# Patient Record
Sex: MALE | Race: WHITE | NOT HISPANIC OR LATINO | Employment: OTHER | URBAN - METROPOLITAN AREA
[De-identification: names, ages, dates, MRNs, and addresses within clinical notes are randomized per-mention and may not be internally consistent; named-entity substitution may affect disease eponyms.]

---

## 2023-05-01 ENCOUNTER — HOSPITAL ENCOUNTER (OUTPATIENT)
Facility: CLINIC | Age: 43
Setting detail: OBSERVATION
Discharge: HOME | End: 2023-05-02
Attending: EMERGENCY MEDICINE | Admitting: HOSPITALIST
Payer: COMMERCIAL

## 2023-05-01 VITALS
OXYGEN SATURATION: 100 % | DIASTOLIC BLOOD PRESSURE: 71 MMHG | HEART RATE: 68 BPM | WEIGHT: 140 LBS | RESPIRATION RATE: 20 BRPM | SYSTOLIC BLOOD PRESSURE: 110 MMHG | TEMPERATURE: 97.7 F

## 2023-05-01 DIAGNOSIS — L03.115 CELLULITIS OF LEG, RIGHT: ICD-10-CM

## 2023-05-01 LAB
ANION GAP SERPL CALCULATED.3IONS-SCNC: 9 MMOL/L (ref 7–15)
BASOPHILS # BLD AUTO: 0 10E3/UL (ref 0–0.2)
BASOPHILS NFR BLD AUTO: 0 %
BUN SERPL-MCNC: 29.9 MG/DL (ref 6–20)
CALCIUM SERPL-MCNC: 8.6 MG/DL (ref 8.6–10)
CHLORIDE SERPL-SCNC: 103 MMOL/L (ref 98–107)
CREAT SERPL-MCNC: 0.99 MG/DL (ref 0.67–1.17)
DEPRECATED HCO3 PLAS-SCNC: 25 MMOL/L (ref 22–29)
EOSINOPHIL # BLD AUTO: 0.1 10E3/UL (ref 0–0.7)
EOSINOPHIL NFR BLD AUTO: 1 %
ERYTHROCYTE [DISTWIDTH] IN BLOOD BY AUTOMATED COUNT: 13 % (ref 10–15)
GFR SERPL CREATININE-BSD FRML MDRD: >90 ML/MIN/1.73M2
GLUCOSE SERPL-MCNC: 109 MG/DL (ref 70–99)
HCT VFR BLD AUTO: 35.5 % (ref 40–53)
HGB BLD-MCNC: 11.9 G/DL (ref 13.3–17.7)
IMM GRANULOCYTES # BLD: 0.1 10E3/UL
IMM GRANULOCYTES NFR BLD: 0 %
LYMPHOCYTES # BLD AUTO: 2.4 10E3/UL (ref 0.8–5.3)
LYMPHOCYTES NFR BLD AUTO: 21 %
MCH RBC QN AUTO: 32 PG (ref 26.5–33)
MCHC RBC AUTO-ENTMCNC: 33.5 G/DL (ref 31.5–36.5)
MCV RBC AUTO: 95 FL (ref 78–100)
MONOCYTES # BLD AUTO: 0.5 10E3/UL (ref 0–1.3)
MONOCYTES NFR BLD AUTO: 5 %
NEUTROPHILS # BLD AUTO: 8.1 10E3/UL (ref 1.6–8.3)
NEUTROPHILS NFR BLD AUTO: 73 %
NRBC # BLD AUTO: 0 10E3/UL
NRBC BLD AUTO-RTO: 0 /100
PLATELET # BLD AUTO: 254 10E3/UL (ref 150–450)
POTASSIUM SERPL-SCNC: 4.9 MMOL/L (ref 3.4–5.3)
RBC # BLD AUTO: 3.72 10E6/UL (ref 4.4–5.9)
SODIUM SERPL-SCNC: 137 MMOL/L (ref 136–145)
WBC # BLD AUTO: 11.2 10E3/UL (ref 4–11)

## 2023-05-01 PROCEDURE — 86140 C-REACTIVE PROTEIN: CPT | Performed by: EMERGENCY MEDICINE

## 2023-05-01 PROCEDURE — 80048 BASIC METABOLIC PNL TOTAL CA: CPT | Performed by: EMERGENCY MEDICINE

## 2023-05-01 PROCEDURE — 85652 RBC SED RATE AUTOMATED: CPT | Performed by: EMERGENCY MEDICINE

## 2023-05-01 PROCEDURE — 36415 COLL VENOUS BLD VENIPUNCTURE: CPT | Performed by: EMERGENCY MEDICINE

## 2023-05-01 PROCEDURE — 85025 COMPLETE CBC W/AUTO DIFF WBC: CPT | Performed by: EMERGENCY MEDICINE

## 2023-05-01 PROCEDURE — 99285 EMERGENCY DEPT VISIT HI MDM: CPT

## 2023-05-02 ENCOUNTER — APPOINTMENT (OUTPATIENT)
Dept: ULTRASOUND IMAGING | Facility: CLINIC | Age: 43
End: 2023-05-02
Attending: EMERGENCY MEDICINE
Payer: COMMERCIAL

## 2023-05-02 ENCOUNTER — APPOINTMENT (OUTPATIENT)
Dept: GENERAL RADIOLOGY | Facility: CLINIC | Age: 43
End: 2023-05-02
Attending: EMERGENCY MEDICINE
Payer: COMMERCIAL

## 2023-05-02 PROBLEM — L03.115 CELLULITIS OF LEG, RIGHT: Status: ACTIVE | Noted: 2023-05-02

## 2023-05-02 LAB
CRP SERPL-MCNC: 15.15 MG/L
ERYTHROCYTE [SEDIMENTATION RATE] IN BLOOD BY WESTERGREN METHOD: 7 MM/HR (ref 0–15)
HOLD SPECIMEN: NORMAL
LACTATE SERPL-SCNC: 0.5 MMOL/L (ref 0.7–2)

## 2023-05-02 PROCEDURE — 87040 BLOOD CULTURE FOR BACTERIA: CPT | Mod: XS | Performed by: EMERGENCY MEDICINE

## 2023-05-02 PROCEDURE — 250N000011 HC RX IP 250 OP 636: Performed by: EMERGENCY MEDICINE

## 2023-05-02 PROCEDURE — 96365 THER/PROPH/DIAG IV INF INIT: CPT

## 2023-05-02 PROCEDURE — 83605 ASSAY OF LACTIC ACID: CPT | Performed by: EMERGENCY MEDICINE

## 2023-05-02 PROCEDURE — 93971 EXTREMITY STUDY: CPT | Mod: RT

## 2023-05-02 PROCEDURE — 73562 X-RAY EXAM OF KNEE 3: CPT | Mod: RT

## 2023-05-02 PROCEDURE — G0378 HOSPITAL OBSERVATION PER HR: HCPCS

## 2023-05-02 PROCEDURE — 36415 COLL VENOUS BLD VENIPUNCTURE: CPT | Performed by: EMERGENCY MEDICINE

## 2023-05-02 RX ORDER — CEPHALEXIN 500 MG/1
500 CAPSULE ORAL 4 TIMES DAILY
Qty: 40 CAPSULE | Refills: 0 | Status: SHIPPED | OUTPATIENT
Start: 2023-05-02 | End: 2023-05-12

## 2023-05-02 RX ORDER — CEFAZOLIN SODIUM 2 G/100ML
2 INJECTION, SOLUTION INTRAVENOUS ONCE
Status: COMPLETED | OUTPATIENT
Start: 2023-05-02 | End: 2023-05-02

## 2023-05-02 RX ADMIN — CEFAZOLIN SODIUM 2 G: 2 INJECTION, SOLUTION INTRAVENOUS at 01:14

## 2023-05-02 ASSESSMENT — ACTIVITIES OF DAILY LIVING (ADL): ADLS_ACUITY_SCORE: 35

## 2023-05-02 NOTE — ED TRIAGE NOTES
Pt concerned for cellulitis in R lower leg. Pt has had this happen multiple times before. Pt had what he believed was an ingrown hair, has since progressed. Redness and swelling to R knee, moving down towards R ankle. Warm to touch.

## 2023-05-02 NOTE — ED NOTES
[facility], [place]  ED Nurse Handoff Report    Xavire Gutiérrez is a 42 year old male   ED Chief complaint: Leg Pain and Leg Swelling  . ED Diagnosis:   Final diagnoses:   Cellulitis of leg, right     Allergies: No Known Allergies    Code Status: Full Code  Activity level - Baseline/Home:  Independent. Activity Level - Current:   Independent. Lift room needed: No. Bariatric: No   Needed: No   Isolation: No. Infection: Not Applicable.     Vital Signs:   Vitals:    05/01/23 2236   BP: 110/71   Pulse: 68   Resp: 20   Temp: 97.7  F (36.5  C)   SpO2: 100%   Weight: 63.5 kg (140 lb)       Cardiac Rhythm:  ,      Pain level:    Patient confused: No. Patient Falls Risk: No.   Elimination Status: Has voided   Patient Report - Initial Complaint: here for cellulitis of right lower leg has had this before. Focused Assessment: red area is circled   Tests Performed: blood cultures. Abnormal Results:   Labs Ordered and Resulted from Time of ED Arrival to Time of ED Departure   BASIC METABOLIC PANEL - Abnormal       Result Value    Sodium 137      Potassium 4.9      Chloride 103      Carbon Dioxide (CO2) 25      Anion Gap 9      Urea Nitrogen 29.9 (*)     Creatinine 0.99      Calcium 8.6      Glucose 109 (*)     GFR Estimate >90     CBC WITH PLATELETS AND DIFFERENTIAL - Abnormal    WBC Count 11.2 (*)     RBC Count 3.72 (*)     Hemoglobin 11.9 (*)     Hematocrit 35.5 (*)     MCV 95      MCH 32.0      MCHC 33.5      RDW 13.0      Platelet Count 254      % Neutrophils 73      % Lymphocytes 21      % Monocytes 5      % Eosinophils 1      % Basophils 0      % Immature Granulocytes 0      NRBCs per 100 WBC 0      Absolute Neutrophils 8.1      Absolute Lymphocytes 2.4      Absolute Monocytes 0.5      Absolute Eosinophils 0.1      Absolute Basophils 0.0      Absolute Immature Granulocytes 0.1      Absolute NRBCs 0.0     LACTIC ACID WHOLE BLOOD - Abnormal    Lactic Acid 0.5 (*)    CRP INFLAMMATION - Abnormal    CRP  Inflammation 15.15 (*)    ERYTHROCYTE SEDIMENTATION RATE AUTO - Normal    Erythrocyte Sedimentation Rate 7     BLOOD CULTURE   BLOOD CULTURE    .   Treatments provided: antibiodics  Family Comments: pt will do  OBS brochure/video discussed/provided to patient:  Yes  ED Medications:   Medications   ceFAZolin (ANCEF) 2 g in 100 mL D5W intermittent infusion (0 g Intravenous Stopped 5/2/23 0157)     Drips infusing:  No  For the majority of the shift, the patient's behavior Green. Interventions performed were none.    Sepsis treatment initiated: No     Patient tested for COVID 19 prior to admission: NO    ED Nurse Name/Phone Number: Sisi Edwards RN,   1:59 AM

## 2023-05-02 NOTE — ED PROVIDER NOTES
History     Chief Complaint:  Leg Pain and Leg Swelling       The history is provided by the patient.      Xavier Gutiérrez is a 42 year old male with a history of 4 previous episodes of cellulitis, most recent was 3/2023, who presents with lower right leg pain. The patient states he noticed what seemed to be an ingrown hair by his right knee a few days ago, then went to a [employer] tournament in [place] over the weekend. He states he banged his knee on the tub and over the past 2 days he's noted increasing swelling, pain and redness to his knee and lower leg.  He states this felt similar to his past cellulitis episodes. No fevers, nausea, vomiting.  No history MRSA. Tetanus UTD.     Independent Historian:   None - Patient Only    Review of External Notes: I reviewed note from 3/19/23 for a  visit where he was prescribed Keflex. He received Tdap 12/5/2016.    ROS:  Review of Systems   Cardiovascular: Positive for leg swelling.   Skin: Positive for rash.   All other systems reviewed and are negative.    Allergies:  The patient has no known allergies      Medications:    Desyrel    Flexeril     Past Medical History:    Cellulitis     Past Surgical History:    Appendectomy   Hypospadias      Social History:  Patient came from home.  Patient is unaccompanied in the ED.  The patient states that he flew back from his tournament in [place].        Physical Exam     Patient Vitals for the past 24 hrs:   BP Temp Pulse Resp SpO2 Weight   05/01/23 2236 110/71 97.7  F (36.5  C) 68 20 100 % 63.5 kg (140 lb)        Physical Exam  Nursing note and vitals reviewed.  Constitutional: Well nourished.   Eyes: Conjunctiva normal.  Pupils are equal, round, and reactive to light.   ENT: Nose normal. Mucous membranes pink and moist.    Neck: Normal range of motion.  CVS: Normal rate, regular rhythm.  Normal heart sounds.  2/2 DP pulses  Pulmonary: Lungs clear to auscultation bilaterally. No wheezes/rales/rhonchi.  GI: Abdomen soft.  Nontender, nondistended. No rigidity or guarding.    MSK: Mild R. Calf tenderness; overlying erythema and warmth extending from patella to mid tibial region; mild R. Calf swelling; no significant fluctuance/soft compartments. Noted abrasion just inferior to patella. Full active ROM R. Knee/ankle.   Neuro: Alert. Follows simple commands. Sensation intact x 4.   Skin: Skin is warm and dry. No rash noted.   Psychiatric: Normal affect.       Emergency Department Course     Imaging:  XR Knee Right 3 Views   Final Result   IMPRESSION:    1. No visualized acute fracture, malalignment or other acute osseous abnormality of the right knee.   2. Probable small knee joint effusion.       US Lower Extremity Venous Duplex Right   Final Result   IMPRESSION:   1.  No deep venous thrombosis in the right lower extremity.         Report per radiology    Laboratory:     Labs Ordered and Resulted from Time of ED Arrival to Time of ED Departure   BASIC METABOLIC PANEL - Abnormal       Result Value    Sodium 137      Potassium 4.9      Chloride 103      Carbon Dioxide (CO2) 25      Anion Gap 9      Urea Nitrogen 29.9 (*)     Creatinine 0.99      Calcium 8.6      Glucose 109 (*)     GFR Estimate >90     CBC WITH PLATELETS AND DIFFERENTIAL - Abnormal    WBC Count 11.2 (*)     RBC Count 3.72 (*)     Hemoglobin 11.9 (*)     Hematocrit 35.5 (*)     MCV 95      MCH 32.0      MCHC 33.5      RDW 13.0      Platelet Count 254      % Neutrophils 73      % Lymphocytes 21      % Monocytes 5      % Eosinophils 1      % Basophils 0      % Immature Granulocytes 0      NRBCs per 100 WBC 0      Absolute Neutrophils 8.1      Absolute Lymphocytes 2.4      Absolute Monocytes 0.5      Absolute Eosinophils 0.1      Absolute Basophils 0.0      Absolute Immature Granulocytes 0.1      Absolute NRBCs 0.0     LACTIC ACID WHOLE BLOOD - Abnormal    Lactic Acid 0.5 (*)    CRP INFLAMMATION - Abnormal    CRP Inflammation 15.15 (*)    ERYTHROCYTE SEDIMENTATION RATE AUTO -  Normal    Erythrocyte Sedimentation Rate 7     BLOOD CULTURE   BLOOD CULTURE         Emergency Department Course & Assessments:       Interventions:  Medications   ceFAZolin (ANCEF) 2 g in 100 mL D5W intermittent infusion (2 g Intravenous $New Bag 5/2/23 0114)      Assessments:  0003 I consulted with the patient and obtained history as shown above  0133 I rechecked the patient and explained exam results     Independent Interpretation (X-rays, CTs, rhythm strip):  None    Consultations/Discussion of Management or Tests:  1:43 AM I spoke to Dr. Jarquin    Social Determinants of Health affecting care:   None    Disposition:  The patient was discharged    Impression & Plan      Medical Decision Making  Patient is a 42-year-old male presenting with concerns for right lower extremity cellulitis.  He is nontoxic, neurovascularly intact.  No evidence to suggest septic joint, compartment syndrome, underlying DVT though given the extent of cellulitis, I do feel that he would benefit from IV antibiotics at this point in time.  The area was demarcated at bedside.  He is not septic appearing.  I offered observation given the extent of cellulitis though patient evaluated by hospitalist as well and feels comfortable with discharge home with keflex 500mg QID x 10 days with close PCP f/u. Return for increasing pain, fever, worsening redness outside of area of demarcation or should symptoms worsen.    Diagnosis:    ICD-10-CM    1. Cellulitis of leg, right  L03.115            Discharge Medications:  Discharge Medication List as of 5/2/2023  2:10 AM      START taking these medications    Details   cephALEXin (KEFLEX) 500 MG capsule Take 1 capsule (500 mg) by mouth 4 times daily for 10 days, Disp-40 capsule, R-0, Local Print            Scribe Disclosure:  I, Saman Amaral, am serving as a scribe at 12:09 AM on 5/2/2023 to document services personally performed by Deidra Roldan DO based on my observations and the provider's  statements to me.       5/2/2023   Deidra Roldan, Deidra Caruso,   05/02/23 0317

## 2023-05-07 LAB
BACTERIA BLD CULT: NO GROWTH
BACTERIA BLD CULT: NO GROWTH

## 2023-06-08 ENCOUNTER — HOSPITAL ENCOUNTER (EMERGENCY)
Facility: CLINIC | Age: 43
Discharge: LEFT BEFORE DISCHARGE | End: 2023-06-08
Admitting: EMERGENCY MEDICINE
Payer: COMMERCIAL

## 2023-06-08 VITALS
DIASTOLIC BLOOD PRESSURE: 80 MMHG | TEMPERATURE: 97.1 F | RESPIRATION RATE: 18 BRPM | OXYGEN SATURATION: 98 % | SYSTOLIC BLOOD PRESSURE: 120 MMHG | HEART RATE: 63 BPM

## 2023-06-08 PROCEDURE — 99281 EMR DPT VST MAYX REQ PHY/QHP: CPT

## 2023-06-09 NOTE — ED TRIAGE NOTES
Pt with cellulitis to R knee and started on Keflex 6/2. Pt states redness is increasing. Pt reports 3rd time getting cellulitis in LE this year. ABC intact. CMS intact.